# Patient Record
Sex: MALE | Race: BLACK OR AFRICAN AMERICAN | NOT HISPANIC OR LATINO | URBAN - METROPOLITAN AREA
[De-identification: names, ages, dates, MRNs, and addresses within clinical notes are randomized per-mention and may not be internally consistent; named-entity substitution may affect disease eponyms.]

---

## 2022-10-31 ENCOUNTER — EMERGENCY (EMERGENCY)
Facility: HOSPITAL | Age: 67
LOS: 1 days | Discharge: HOME | End: 2022-10-31
Admitting: EMERGENCY MEDICINE
Payer: COMMERCIAL

## 2022-10-31 VITALS
RESPIRATION RATE: 18 BRPM | HEART RATE: 100 BPM | HEIGHT: 75 IN | WEIGHT: 214.95 LBS | TEMPERATURE: 98 F | OXYGEN SATURATION: 97 % | SYSTOLIC BLOOD PRESSURE: 121 MMHG | DIASTOLIC BLOOD PRESSURE: 75 MMHG

## 2022-10-31 LAB
RAPID RVP RESULT: SIGNIFICANT CHANGE UP
SARS-COV-2 RNA SPEC QL NAA+PROBE: SIGNIFICANT CHANGE UP

## 2022-10-31 PROCEDURE — 99284 EMERGENCY DEPT VISIT MOD MDM: CPT

## 2022-10-31 PROCEDURE — 99285 EMERGENCY DEPT VISIT HI MDM: CPT

## 2022-10-31 PROCEDURE — 0225U NFCT DS DNA&RNA 21 SARSCOV2: CPT

## 2022-10-31 RX ORDER — LIDOCAINE 4 G/100G
5 CREAM TOPICAL
Qty: 45 | Refills: 0
Start: 2022-10-31 | End: 2022-11-02

## 2022-10-31 RX ORDER — LIDOCAINE 4 G/100G
5 CREAM TOPICAL ONCE
Refills: 0 | Status: COMPLETED | OUTPATIENT
Start: 2022-10-31 | End: 2022-10-31

## 2022-10-31 RX ADMIN — LIDOCAINE 5 MILLILITER(S): 4 CREAM TOPICAL at 14:48

## 2022-10-31 NOTE — ED PROVIDER NOTE - OBJECTIVE STATEMENT
The pt is a 66 y/o M, who presents to ED c/o sore throat, white patches in mouth and chills x 4 d. Pt states that took home covid test and neg, able to swallow own saliva and liquids, but having dysphagia to po solids, has not taken any pain meds. Denies fever, cp, sob, cough, n/v/d, abd pain, rash.

## 2022-10-31 NOTE — ED ADULT NURSE NOTE - OBJECTIVE STATEMENT
67y male, hx of prostate ca in remission, c/o sore throat, chills, and "tongue redness" since Friday. Respirations even and unlabored. speaking in clear, full sentences. denies fever, SOB, HA, dizziness, n/v/d, numbness/tingling. No acute distress noted at this time.

## 2022-10-31 NOTE — ED PROVIDER NOTE - CLINICAL SUMMARY MEDICAL DECISION MAKING FREE TEXT BOX
pt c/o sore throat, chills -- not drooling, able to swallow saliva w/o dif, tolerating fluids, no voice changes, + exudative tonsillitis on exam, will send rvp, tx w/abx and viscous lido prn pain, po fluids and soft diet discussed, f/u w/ent , pt understands and agrees w/plan, strict return precautions given

## 2022-10-31 NOTE — ED PROVIDER NOTE - ENMT, MLM
Airway patent, Nasal mucosa clear. Mouth with normal mucosa. Pharynx w/mod erythema, + exudates, no PTA/no tonsillar swelling b/l, uvula midline, + diffuse cervical lymphadenopathy b/l

## 2022-10-31 NOTE — ED PROVIDER NOTE - NSFOLLOWUPINSTRUCTIONS_ED_ALL_ED_FT
Pharyngitis  Upper body outline including the pharynx.   Pharyngitis is a sore throat (pharynx). This is when there is redness, pain, and swelling in your throat. Most of the time, this condition gets better on its own. In some cases, you may need medicine.  What are the causes?  •An infection from a virus.  •An infection from bacteria.  •Allergies.  What increases the risk?  •Being 5–24 years old.  •Being in crowded environments. These include:  •Daycares.  •Schools.  •Dormitories.  •Living in a place with cold temperatures outside.  •Having a weakened disease-fighting (immune) system.  What are the signs or symptoms?  Symptoms may vary depending on the cause. Common symptoms include:  •Sore throat.  •Tiredness (fatigue).  •Low-grade fever.  •Stuffy nose.  •Cough.  •Headache.  Other symptoms may include:  •Glands in the neck (lymph nodes) that are swollen.  •Skin rashes.  •Film on the throat or tonsils. This can be caused by an infection from bacteria.  •Vomiting.  •Red, itchy eyes.  •Loss of appetite.  •Joint pain and muscle aches.  •Tonsils that are temporarily bigger than usual (enlarged).  How is this treated?  Many times, treatment is not needed. This condition usually gets better in 3–4 days without treatment.  If the infection is caused by a bacteria, you may be need to take antibiotics.  Follow these instructions at home:  Medicines   •Take over-the-counter and prescription medicines only as told by your doctor.  •If you were prescribed an antibiotic medicine, take it as told by your doctor. Do not stop taking the antibiotic even if you start to feel better.  •Use throat lozenges or sprays to soothe your throat as told by your doctor.  •Children can get pharyngitis. Do not give your child aspirin.  Managing pain   A cup of hot tea.   To help with pain, try:•Sipping warm liquids, such as:  •Broth.  •Herbal tea.  •Warm water.  •Eating or drinking cold or frozen liquids, such as frozen ice pops.  •Rinsing your mouth (gargle) with a salt water mixture 3–4 times a day or as needed.  •To make salt water, dissolve ½–1 tsp (3–6 g) of salt in 1 cup (237 mL) of warm water.  •Do not swallow this mixture.  •Sucking on hard candy or throat lozenges.  •Putting a cool-mist humidifier in your bedroom at night to moisten the air.  •Sitting in the bathroom with the door closed for 5–10 minutes while you run hot water in the shower.  General instructions   A do not smoke cigarettes sign.   • Do not smoke or use any products that contain nicotine or tobacco. If you need help quitting, ask your doctor.  •Rest as told by your doctor.  •Drink enough fluid to keep your pee (urine) pale yellow.  How is this prevented?  •Wash your hands often for at least 20 seconds with soap and water. If soap and water are not available, use hand .  • Do not touch your eyes, nose, or mouth with unwashed hands. Wash hands after touching these areas.  • Do not share cups or eating utensils.  •Avoid close contact with people who are sick.  Contact a doctor if:  •You have large, tender lumps in your neck.  •You have a rash.  •You cough up green, yellow-brown, or bloody spit.  Get help right away if:  •You have a stiff neck.  •You drool or cannot swallow liquids.  •You cannot drink or take medicines without vomiting.  •You have very bad pain that does not go away with medicine.  •You have problems breathing, and it is not from a stuffy nose.  •You have new pain and swelling in your knees, ankles, wrists, or elbows.  These symptoms may be an emergency. Get help right away. Call your local emergency services (911 in the [place]   • Do not wait to see if the symptoms will go away.   • Do not drive yourself to the hospital.   Summary  •Pharyngitis is a sore throat (pharynx). This is when there is redness, pain, and swelling in your throat.  •Most of the time, pharyngitis gets better on its own. Sometimes, you may need medicine.  •If you were prescribed an antibiotic medicine, take it as told by your doctor. Do not stop taking the antibiotic even if you start to feel better.

## 2022-10-31 NOTE — ED ADULT TRIAGE NOTE - CHIEF COMPLAINT QUOTE
Pt presents to ED c/o sore throat, tongue redness and fatigue x 4 days. denies chest pain, fevers, chills. 2 negative covid home tests.

## 2022-10-31 NOTE — ED PROVIDER NOTE - PATIENT PORTAL LINK FT
You can access the FollowMyHealth Patient Portal offered by [facility] by registering at the following website: [email or web address]. By joining [employer]’s FollowMyHealth portal, you will also be able to view your health information using other applications (apps) compatible with our system.

## 2022-11-03 DIAGNOSIS — Z20.822 CONTACT WITH AND (SUSPECTED) EXPOSURE TO COVID-19: ICD-10-CM

## 2022-11-03 DIAGNOSIS — J03.90 ACUTE TONSILLITIS, UNSPECIFIED: ICD-10-CM

## 2022-11-03 DIAGNOSIS — J02.9 ACUTE PHARYNGITIS, UNSPECIFIED: ICD-10-CM

## 2024-06-11 ENCOUNTER — EMERGENCY (EMERGENCY)
Facility: HOSPITAL | Age: 69
LOS: 1 days | Discharge: HOME | End: 2024-06-11
Admitting: EMERGENCY MEDICINE
Payer: COMMERCIAL

## 2024-06-11 VITALS
RESPIRATION RATE: 17 BRPM | HEART RATE: 80 BPM | TEMPERATURE: 98 F | DIASTOLIC BLOOD PRESSURE: 93 MMHG | HEIGHT: 74 IN | OXYGEN SATURATION: 97 % | SYSTOLIC BLOOD PRESSURE: 164 MMHG | WEIGHT: 197.09 LBS

## 2024-06-11 VITALS
SYSTOLIC BLOOD PRESSURE: 171 MMHG | RESPIRATION RATE: 18 BRPM | OXYGEN SATURATION: 95 % | DIASTOLIC BLOOD PRESSURE: 87 MMHG | HEART RATE: 67 BPM | TEMPERATURE: 99 F

## 2024-06-11 PROCEDURE — 99284 EMERGENCY DEPT VISIT MOD MDM: CPT

## 2024-06-11 PROCEDURE — 99283 EMERGENCY DEPT VISIT LOW MDM: CPT | Mod: 25

## 2024-06-11 PROCEDURE — 96372 THER/PROPH/DIAG INJ SC/IM: CPT

## 2024-06-11 PROCEDURE — 73552 X-RAY EXAM OF FEMUR 2/>: CPT

## 2024-06-11 PROCEDURE — 73552 X-RAY EXAM OF FEMUR 2/>: CPT | Mod: 26,RT

## 2024-06-11 RX ORDER — METHOCARBAMOL 500 MG/1
1000 TABLET, FILM COATED ORAL ONCE
Refills: 0 | Status: COMPLETED | OUTPATIENT
Start: 2024-06-11 | End: 2024-06-11

## 2024-06-11 RX ORDER — LIDOCAINE 4 G/100G
1 CREAM TOPICAL ONCE
Refills: 0 | Status: COMPLETED | OUTPATIENT
Start: 2024-06-11 | End: 2024-06-11

## 2024-06-11 RX ORDER — KETOROLAC TROMETHAMINE 30 MG/ML
15 SYRINGE (ML) INJECTION ONCE
Refills: 0 | Status: DISCONTINUED | OUTPATIENT
Start: 2024-06-11 | End: 2024-06-11

## 2024-06-11 RX ADMIN — LIDOCAINE 1 PATCH: 4 CREAM TOPICAL at 20:31

## 2024-06-11 RX ADMIN — METHOCARBAMOL 1000 MILLIGRAM(S): 500 TABLET, FILM COATED ORAL at 20:32

## 2024-06-11 RX ADMIN — Medication 15 MILLIGRAM(S): at 20:31

## 2024-06-11 NOTE — ED PROVIDER NOTE - NS ED ROS FT
CONSTITUTIONAL: Denies fever and chills    RESPIRATORY: Denies SOB    CARDIOVASCULAR: Denies palpitations and chest pain.    GASTROINTESTINAL: Denies abdominal pain, nausea, vomiting and diarrhea.    GENITOURINARY: Denies dysuria and hematuria.    MUSCULOSKELETAL: See HPI

## 2024-06-11 NOTE — ED PROVIDER NOTE - PATIENT PORTAL LINK FT
You can access the FollowMyHealth Patient Portal offered by [facility] by registering at the following website: [email or web address]. By joining [employer]’s FollowMyHealth portal, you will also be able to view your health information using other applications (apps) compatible with our system.

## 2024-06-11 NOTE — ED ADULT NURSE NOTE - OBJECTIVE STATEMENT
Pt arrived to ED c/o leg pain. Pt reports buttocks pain radiating down R leg since x3 days. Denies trauma or injury

## 2024-06-11 NOTE — ED PROVIDER NOTE - CLINICAL SUMMARY MEDICAL DECISION MAKING FREE TEXT BOX
67 y/o male w/ hx htn, dm, sciatica, PUD, HLD, asthma, prostate ca in remission p/w R buttock pain radiating down anterior RLE x 4 days, worse with movement.  Denies known trauma/ injury. Called his pmd who prescribed amitriptyline yesterday, which he states has helped.  Denies f/c, cp, sob, abd pain, n/v/d, numbness/tingling/weakness to ext, calf pain, back pain, saddle anesthesia, changes in urination/bowel habits.  Denies smoking, recent travel, or hx dvt/pe.  Exam notable for ttp to R buttock/ lateral thigh.  FROM. NVI  No leg swelling/calf ttp  Overall suspect likely sciatica vs piriformis syndrome vs msk strain/spasm  Doubt DVT, no RF suggestive of  XR femur without obvious fx/ deformity  Will tx pain, refer to spine

## 2024-06-11 NOTE — ED PROVIDER NOTE - CARE PROVIDER_API CALL
Lemuel Pierre Blue  Orthopaedic Surgery  [address]  [place] [address]  Phone: [phone]  Fax: [phone]  Follow Up Time:

## 2024-06-11 NOTE — ED PROVIDER NOTE - NSICDXPASTMEDICALHX_GEN_ALL_CORE_FT
PAST MEDICAL HISTORY:  DM (diabetes mellitus)     HLD (hyperlipidemia)     HTN (hypertension)     Prostate CA

## 2024-06-11 NOTE — ED PROVIDER NOTE - PHYSICAL EXAMINATION
CONSTITUTIONAL: Awake, alert.  Nontoxic, no acute distress.    HEAD: Normocephalic, atraumatic.    EYES: Conjunctivae clear without exudates or hemorrhage. Sclera is non-icteric.    ENT: Normal appearing external ears, nose, mucous membranes moist.    NECK: supple, trachea midline.    HEART:  Normal rate.    LUNGS:  No acute respiratory distress.  Non-tachypneic and non-labored.    MUSCULOSKELETAL:  Normal appearing extremities without obvious deformity, rash, ecchymosis, erythema.  No swelling.  Warm. +ttp to R buttock and along R lateral thigh.  No bony ttp.  FROM b/l lower extremities.  5/5 strength b/l  lower ext.  Sensation and motor function grossly intact.  Strong equal peripheral pulses b/l.   Cap refill < 2 b/l upper and lower ext.  All compartments soft.  No calf swelling/ttp.  No edema.    BACK: No midline or paraspinal ttp or obvious deformity.     SKIN: Skin in warm, dry and intact without rashes or lesions.  Appropriate color for ethnicity.    NEUROLOGICAL:  Patient is alert, oriented x person, place and time.    PSYCH: Appropriate mood and affect. Good judgment and insight.

## 2024-06-11 NOTE — ED PROVIDER NOTE - OBJECTIVE STATEMENT
69 y/o male w/ hx htn, dm, sciatica, PUD, HLD, asthma, prostate ca in remission p/w R buttock pain radiating down anterior RLE x 4 days, worse with movement.  Denies known trauma/ injury. Called his pmd who prescribed amitriptyline yesterday, which he states has helped.  Denies f/c, cp, sob, abd pain, n/v/d, numbness/tingling/weakness to ext, calf pain, back pain, saddle anesthesia, changes in urination/bowel habits.  Denies smoking, recent travel, or hx dvt/pe.

## 2024-06-11 NOTE — ED PROVIDER NOTE - NSFOLLOWUPINSTRUCTIONS_ED_ALL_ED_FT
Thank you for visiting [facility] Emergency Department.      We saw you today for leg pain.  I suspect this could be from sciatica or your muscle.    PAIN CONTROL:   You may take ibuprofen (Motrin, Advil) 600 mg (3 regular tablets) every 6 hours as needed for pain.  Please take with food.  Stop taking if you develop abdominal pain, dark/ bloody stools.  Do not mix with other NSAIDS (ie. Naproxen, Aleve, Celecoxib).  You may also take acetaminophen (Tylenol) 650-975mg (2-3 regular tablets) or 500-1000mg (1-2 extra strength tablets) every 6 hours as needed for pain.  Do not exceed 4000 mg in 1 day. These medications may be bought over the counter.    I recommend alternating the Ibuprofen and Tylenol so you are getting medications around the clock.  For example take the Ibuprofen, then 3 hours later take the Tylenol, then 3 hours later take the Ibuprofen, and repeat as needed.    You may take Robaxin as prescribed and only as needed for severe pain.  Do not drive or operate machinery while taking this medication.  This medication may make you feel sleepy and has the potential to be habit-forming or addictive.    Rest. Apply heat to affected area 20 minutes on, then 20 minutes off.  You may repeat throughout the day. Elevate affected extremity.    If your pain does not improve or worsens despite these measures, you should seek medical attention and/or may need to follow up with a spine specialist.    Please know that no emergency visit is complete without follow-up with your primary care provider in 1 week.  Please bring copies of all discharge papers and results and show to your doctor.      Please continue taking all previous medications as instructed unless we discussed otherwise.     I appreciated your patience and hope you feel better soon.     Return to ER immediately if you develop fevers, chills, chest pain, shortness of breath, worsening and/or any concerning symptoms.

## 2024-06-13 DIAGNOSIS — J45.909 UNSPECIFIED ASTHMA, UNCOMPLICATED: ICD-10-CM

## 2024-06-13 DIAGNOSIS — I10 ESSENTIAL (PRIMARY) HYPERTENSION: ICD-10-CM

## 2024-06-13 DIAGNOSIS — M54.30 SCIATICA, UNSPECIFIED SIDE: ICD-10-CM

## 2024-06-13 DIAGNOSIS — E11.9 TYPE 2 DIABETES MELLITUS WITHOUT COMPLICATIONS: ICD-10-CM

## 2024-06-13 DIAGNOSIS — Z87.11 PERSONAL HISTORY OF PEPTIC ULCER DISEASE: ICD-10-CM

## 2024-06-13 DIAGNOSIS — M54.50 LOW BACK PAIN, UNSPECIFIED: ICD-10-CM

## 2024-06-13 DIAGNOSIS — Z85.46 PERSONAL HISTORY OF MALIGNANT NEOPLASM OF PROSTATE: ICD-10-CM

## 2024-06-13 DIAGNOSIS — E78.5 HYPERLIPIDEMIA, UNSPECIFIED: ICD-10-CM
